# Patient Record
Sex: FEMALE | Race: WHITE | NOT HISPANIC OR LATINO | URBAN - METROPOLITAN AREA
[De-identification: names, ages, dates, MRNs, and addresses within clinical notes are randomized per-mention and may not be internally consistent; named-entity substitution may affect disease eponyms.]

---

## 2017-01-16 ENCOUNTER — OFFICE (OUTPATIENT)
Dept: URBAN - METROPOLITAN AREA CLINIC 10 | Facility: CLINIC | Age: 82
End: 2017-01-16
Payer: COMMERCIAL

## 2017-01-16 VITALS
WEIGHT: 117 LBS | HEIGHT: 65 IN | DIASTOLIC BLOOD PRESSURE: 64 MMHG | SYSTOLIC BLOOD PRESSURE: 123 MMHG | HEART RATE: 81 BPM

## 2017-01-16 DIAGNOSIS — E11.9 TYPE 2 DIABETES MELLITUS WITHOUT COMPLICATIONS: ICD-10-CM

## 2017-01-16 DIAGNOSIS — R74.8 ABNORMAL LEVELS OF OTHER SERUM ENZYMES: ICD-10-CM

## 2017-01-16 PROCEDURE — G8427 DOCREV CUR MEDS BY ELIG CLIN: HCPCS

## 2017-01-16 PROCEDURE — 99214 OFFICE O/P EST MOD 30 MIN: CPT

## 2017-01-16 NOTE — SERVICENOTES
I suspect that her elevated liver enzymes are secondary to fatty liver from her diabetes or her cholesterol medicine.  Will await lab studies and ultrasound.

## 2017-01-16 NOTE — SERVICEHPINOTES
The patient is an 85-year-old white female who is referred in for evaluation of elevated liver enzymes. She has been seen by Dr. Tinajero and is followed by cardiology also. A recent liver profile revealed an ALT of 49, AST of 60, alkaline phosphatase of 34 and total bilirubin 0.6. She has no history of liver trouble. She has no history of hepatitis or exposure. She does have congestive heart failure and is followed by cardiology. She also has elevated cholesterol and is on medication for that. She denies any abdominal pain, nausea or vomiting. She does admit to some diarrhea but no constipation melena or hematochezia. Weight and appetite are stable. She just found out that she is a diabetic about 3 weeks ago. She has not had a recent ultrasound. She is on multiple medications as reviewed.

## 2017-01-17 LAB
HEPATIC FUNCTION PANEL A: ALBUMIN: 4.4 G/DL (ref 3.5–5.2)
HEPATIC FUNCTION PANEL A: ALKALINE PHOSPHATASE: 36 U/L (ref 34–115)
HEPATIC FUNCTION PANEL A: DIRECT BILIRUBIN: 0.2 MG/DL (ref 0–0.2)
HEPATIC FUNCTION PANEL A: SGOT (AST): 51 U/L — HIGH (ref 13–40)
HEPATIC FUNCTION PANEL A: SGPT (ALT): 38 U/L (ref 7–52)
HEPATIC FUNCTION PANEL A: TOTAL BILIRUBIN: 0.3 MG/DL (ref 0.3–1.2)
HEPATIC FUNCTION PANEL A: TOTAL PROTEIN: 7.2 G/DL (ref 6.4–8.3)